# Patient Record
Sex: FEMALE | Race: BLACK OR AFRICAN AMERICAN | URBAN - METROPOLITAN AREA
[De-identification: names, ages, dates, MRNs, and addresses within clinical notes are randomized per-mention and may not be internally consistent; named-entity substitution may affect disease eponyms.]

---

## 2022-03-19 PROBLEM — F33.1 MODERATE EPISODE OF RECURRENT MAJOR DEPRESSIVE DISORDER (HCC): Status: ACTIVE | Noted: 2017-12-14

## 2024-07-17 ENCOUNTER — TELEPHONE (OUTPATIENT)
Dept: ORTHOPEDIC SURGERY | Age: 59
End: 2024-07-17

## 2024-07-17 NOTE — TELEPHONE ENCOUNTER
Pt would like a second opinion on her hand. Pt will be coming in to the [place] location to sign a release of records form so we can get her records sent over to us.

## 2024-07-19 ENCOUNTER — CLINICAL DOCUMENTATION (OUTPATIENT)
Dept: ORTHOPEDIC SURGERY | Age: 59
End: 2024-07-19

## 2024-08-06 ENCOUNTER — TELEPHONE (OUTPATIENT)
Dept: ORTHOPEDIC SURGERY | Age: 59
End: 2024-08-06

## 2024-08-06 NOTE — TELEPHONE ENCOUNTER
Pt would like to schedule a second opinion appt for her Lt hand. Pt was seen at [facility] this year and had sx in April. Notes are in Care everywhere and I placed a Xray disc in your box. Please review and advise.

## 2024-09-03 ENCOUNTER — TELEPHONE (OUTPATIENT)
Dept: ORTHOPEDIC SURGERY | Age: 59
End: 2024-09-03

## 2024-09-03 NOTE — TELEPHONE ENCOUNTER
Called the pt after getting a message from Khushbu about wanting her records mailed to her. Informed her that we only received a Xray disc which did not have anything on it. Pt then went on to say she needs a letter stating why  will not see her, informed her that we don't do that and the doctors do not have to give a reason to why they will not see a patient. Patient and  was upset but understood.